# Patient Record
Sex: FEMALE | Race: WHITE | NOT HISPANIC OR LATINO | Employment: FULL TIME | URBAN - METROPOLITAN AREA
[De-identification: names, ages, dates, MRNs, and addresses within clinical notes are randomized per-mention and may not be internally consistent; named-entity substitution may affect disease eponyms.]

---
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
## 2022-04-30 NOTE — OP NOTE
Patient:   Amalia Guadarrama            MRN: [id number]            FIN: [id number]               Age:   18 years     Sex:  Female     :  2000   Associated Diagnoses:   Cholelithiasis without cholecystitis   Author:   Duran Medeiros MD      Operative Note   Operative Information   Date/ Time:  12/10/2018 09:05:00.     Procedures Performed: Laparoscopic cholecystectomy.     Preoperative Diagnosis: Cholelithiasis without cholecystitis (OUM75-FW K80.20).     Postoperative Diagnosis: Cholelithiasis without cholecystitis (NOZ34-BN K80.20).     Surgeon: Duran Medeiros MD.     Assistant: non-physician.     Anesthesia: GETA.     Esimated blood loss: loss less than  10  cc.     Description of Procedure/Findings/    Complications: After consent was obtained and placed on the chart the patient was brought to the operating room and placed supine on the operating table and induced with general anesthesia.  She was then endotracheally intubated without incident.  An adequate level of anesthesia and obtained her abdomen was prepped and draped in sterile fashion.  #15 blade was used to make an incision just below the umbilicus a curvilinear fashion.  Blunt and sharp dissection and electrocautery carried this down of the abdominal wall fascia which was opened with electrocautery.  The parietal peritoneum was then entered with a hemostat.  A Walsh trocar was then inserted and the abdomen was insufflated with CO2.  Upper scope was introduced and a brief inspection revealed no adhesions.  2 5 mm trochars placed in right flank under direct vision.  And a 3rd 5 mm trocar was placed in the upper midline.  The patient was positioned in a reverse Trendelenburg position and the gallbladder was retracted anteriorly and superiorly.  Dissection was undertaken to reveal the cystic duct which was doubly clipped and divided. Further dissection revealed the cystic artery which was doubly clipped and divided.   Electrocautery was then utilized to release the gallbladder from its attachments to the gallbladder bed.  Present delivered via the umbilical incision.  The gallbladder bed was irrigated and inspected and found to be hemostatic and with no evidence of bile leak.  Procedure was interrupted at this point with all trochars being removed and the umbilical incision closed with figure-of-eight 0 Vicryl suture.  Skin was closed at all in the sites with running subcuticular 4-0 Monocryl.  Sterile dressings were applied and patient was recovered from her anesthesia, x-ray in the operating room, and brought to recovery stable condition.  Overall she tolerated the procedure well..

## 2023-10-05 ENCOUNTER — OFFICE VISIT (OUTPATIENT)
Dept: PRIMARY CARE CLINIC | Facility: CLINIC | Age: 23
End: 2023-10-05
Payer: COMMERCIAL

## 2023-10-05 VITALS
HEIGHT: 64 IN | HEART RATE: 70 BPM | WEIGHT: 256 LBS | OXYGEN SATURATION: 98 % | RESPIRATION RATE: 18 BRPM | TEMPERATURE: 98 F | DIASTOLIC BLOOD PRESSURE: 81 MMHG | SYSTOLIC BLOOD PRESSURE: 116 MMHG | BODY MASS INDEX: 43.71 KG/M2

## 2023-10-05 DIAGNOSIS — Z12.4 CERVICAL CANCER SCREENING: ICD-10-CM

## 2023-10-05 DIAGNOSIS — E66.01 CLASS 3 SEVERE OBESITY WITHOUT SERIOUS COMORBIDITY WITH BODY MASS INDEX (BMI) OF 40.0 TO 44.9 IN ADULT, UNSPECIFIED OBESITY TYPE: ICD-10-CM

## 2023-10-05 DIAGNOSIS — F41.1 GAD (GENERALIZED ANXIETY DISORDER): ICD-10-CM

## 2023-10-05 DIAGNOSIS — Z00.00 WELLNESS EXAMINATION: ICD-10-CM

## 2023-10-05 DIAGNOSIS — Z76.89 ENCOUNTER TO ESTABLISH CARE WITH NEW DOCTOR: Primary | ICD-10-CM

## 2023-10-05 PROBLEM — E66.813 CLASS 3 SEVERE OBESITY WITHOUT SERIOUS COMORBIDITY WITH BODY MASS INDEX (BMI) OF 40.0 TO 44.9 IN ADULT: Status: ACTIVE | Noted: 2023-10-05

## 2023-10-05 PROCEDURE — 3008F BODY MASS INDEX DOCD: CPT | Mod: CPTII,,, | Performed by: STUDENT IN AN ORGANIZED HEALTH CARE EDUCATION/TRAINING PROGRAM

## 2023-10-05 PROCEDURE — 3074F SYST BP LT 130 MM HG: CPT | Mod: CPTII,,, | Performed by: STUDENT IN AN ORGANIZED HEALTH CARE EDUCATION/TRAINING PROGRAM

## 2023-10-05 PROCEDURE — 3074F PR MOST RECENT SYSTOLIC BLOOD PRESSURE < 130 MM HG: ICD-10-PCS | Mod: CPTII,,, | Performed by: STUDENT IN AN ORGANIZED HEALTH CARE EDUCATION/TRAINING PROGRAM

## 2023-10-05 PROCEDURE — 3008F PR BODY MASS INDEX (BMI) DOCUMENTED: ICD-10-PCS | Mod: CPTII,,, | Performed by: STUDENT IN AN ORGANIZED HEALTH CARE EDUCATION/TRAINING PROGRAM

## 2023-10-05 PROCEDURE — 99203 PR OFFICE/OUTPT VISIT, NEW, LEVL III, 30-44 MIN: ICD-10-PCS | Mod: ,,, | Performed by: STUDENT IN AN ORGANIZED HEALTH CARE EDUCATION/TRAINING PROGRAM

## 2023-10-05 PROCEDURE — 1159F MED LIST DOCD IN RCRD: CPT | Mod: CPTII,,, | Performed by: STUDENT IN AN ORGANIZED HEALTH CARE EDUCATION/TRAINING PROGRAM

## 2023-10-05 PROCEDURE — 1160F PR REVIEW ALL MEDS BY PRESCRIBER/CLIN PHARMACIST DOCUMENTED: ICD-10-PCS | Mod: CPTII,,, | Performed by: STUDENT IN AN ORGANIZED HEALTH CARE EDUCATION/TRAINING PROGRAM

## 2023-10-05 PROCEDURE — 3079F PR MOST RECENT DIASTOLIC BLOOD PRESSURE 80-89 MM HG: ICD-10-PCS | Mod: CPTII,,, | Performed by: STUDENT IN AN ORGANIZED HEALTH CARE EDUCATION/TRAINING PROGRAM

## 2023-10-05 PROCEDURE — 1160F RVW MEDS BY RX/DR IN RCRD: CPT | Mod: CPTII,,, | Performed by: STUDENT IN AN ORGANIZED HEALTH CARE EDUCATION/TRAINING PROGRAM

## 2023-10-05 PROCEDURE — 3079F DIAST BP 80-89 MM HG: CPT | Mod: CPTII,,, | Performed by: STUDENT IN AN ORGANIZED HEALTH CARE EDUCATION/TRAINING PROGRAM

## 2023-10-05 PROCEDURE — 99203 OFFICE O/P NEW LOW 30 MIN: CPT | Mod: ,,, | Performed by: STUDENT IN AN ORGANIZED HEALTH CARE EDUCATION/TRAINING PROGRAM

## 2023-10-05 PROCEDURE — 1159F PR MEDICATION LIST DOCUMENTED IN MEDICAL RECORD: ICD-10-PCS | Mod: CPTII,,, | Performed by: STUDENT IN AN ORGANIZED HEALTH CARE EDUCATION/TRAINING PROGRAM

## 2023-10-05 RX ORDER — SERTRALINE HYDROCHLORIDE 100 MG/1
100 TABLET, FILM COATED ORAL
COMMUNITY
Start: 2023-09-20 | End: 2023-11-01 | Stop reason: SDUPTHER

## 2023-10-05 RX ORDER — DICYCLOMINE HYDROCHLORIDE 20 MG/1
20 TABLET ORAL EVERY 6 HOURS PRN
COMMUNITY
Start: 2023-07-26

## 2023-10-05 NOTE — PROGRESS NOTES
Subjective:       Patient ID: Amalia Gaudarrama is a 23 y.o. female.    Past Medical History:   GERD  Generalized anxiety disorder  IBS (irritable bowel syndrome)  Kidney stones     Chief Complaint: Establish Care    Presents to the clinic to establish care. No acute complaints at this time. Anxiety well controlled on  Zoloft. Due for pap smear, would like a referral to GYN.       Review of Systems   Constitutional:  Negative for chills and fever.   Respiratory:  Negative for cough.    Cardiovascular:  Negative for chest pain.   Gastrointestinal:  Negative for abdominal pain and vomiting.   Genitourinary:  Negative for dysuria and hematuria.   Psychiatric/Behavioral:  Negative for dysphoric mood. The patient is not nervous/anxious.          Objective:      Physical Exam  Vitals and nursing note reviewed.   Constitutional:       General: She is not in acute distress.     Appearance: Normal appearance. She is not ill-appearing.   HENT:      Mouth/Throat:      Mouth: Mucous membranes are moist.   Eyes:      General: No scleral icterus.     Conjunctiva/sclera: Conjunctivae normal.   Cardiovascular:      Rate and Rhythm: Normal rate and regular rhythm.      Pulses: Normal pulses.      Heart sounds: Normal heart sounds. No murmur heard.  Pulmonary:      Effort: Pulmonary effort is normal. No respiratory distress.      Breath sounds: Normal breath sounds. No wheezing.   Abdominal:      Palpations: Abdomen is soft.      Tenderness: There is no abdominal tenderness.   Musculoskeletal:      Right lower leg: No edema.      Left lower leg: No edema.   Skin:     General: Skin is warm.      Coloration: Skin is not jaundiced.   Neurological:      Mental Status: She is alert. Mental status is at baseline.      Gait: Gait normal.   Psychiatric:         Mood and Affect: Mood normal.         Behavior: Behavior normal.         Assessment & Plan:   1. Encounter to establish care with new doctor  Comments:  Reviewed medical  history and reconciled medications  Ordered wellness labs  Requested records from previous provider/specialist    2. NISA (generalized anxiety disorder)  Assessment & Plan:  Stable, improved   Continue Zoloft  Encouraged self coping mechanisms (deep breathing, exercise, yoga, meditation, etc)     3. Class 3 severe obesity without serious comorbidity with body mass index (BMI) of 40.0 to 44.9 in adult, unspecified obesity type  Assessment & Plan:  Encouraged healthy lifestyle/diet modifications   Exercise 3-5x a week (>30 minutes, including a variety of cardio, weightbearing and lifting exercises)   Eat a well balanced diet comprised of fruit/vegetables, lean protein, and complex carbs    4. Wellness examination  -     CBC Auto Differential; Future; Expected date: 01/05/2024  -     Comprehensive Metabolic Panel; Future; Expected date: 01/05/2024  -     Urinalysis; Future; Expected date: 01/05/2024  -     TSH; Future; Expected date: 01/05/2024  -     Lipid Panel; Future; Expected date: 01/05/2024  -     Hemoglobin A1C; Future; Expected date: 01/05/2024  -     HIV 1/2 Ag/Ab (4th Gen); Future; Expected date: 01/05/2024  -     Hepatitis C Antibody; Future; Expected date: 01/05/2024    5. Cervical cancer screening  -     Ambulatory referral/consult to Obstetrics / Gynecology; Future; Expected date: 10/12/2023    Follow up in about 3 months (around 1/5/2024) for Wellness. In addition to their scheduled follow up, the patient has also been instructed to follow up on as needed basis.

## 2023-11-01 RX ORDER — SERTRALINE HYDROCHLORIDE 100 MG/1
100 TABLET, FILM COATED ORAL DAILY
Qty: 90 TABLET | Refills: 3 | Status: SHIPPED | OUTPATIENT
Start: 2023-11-01

## 2023-11-01 NOTE — TELEPHONE ENCOUNTER
----- Message from Debbi Aquino sent at 11/1/2023  9:09 AM CDT -----  Regarding: Refill Request  .Type:  RX Refill Request    Who Called: pt  Refill or New Rx:refill  RX Name and Strength:sertraline (ZOLOFT) 100 MG tablet  How is the patient currently taking it? (ex. 1XDay):1x  Is this a 30 day or 90 day RX:30  Preferred Pharmacy with phone number:[facility] in [place]  Local or Mail Order:local  Ordering Provider:ELDER Carreon  Would the patient rather a call back or a response via [facility]?   Best Call Back Number:[phone]  Additional Information: refill request and patient is out of medication

## 2023-11-03 PROBLEM — E66.813 CLASS 3 SEVERE OBESITY WITHOUT SERIOUS COMORBIDITY WITH BODY MASS INDEX (BMI) OF 40.0 TO 44.9 IN ADULT: Chronic | Status: ACTIVE | Noted: 2023-10-05

## 2023-11-03 PROBLEM — E66.01 CLASS 3 SEVERE OBESITY WITHOUT SERIOUS COMORBIDITY WITH BODY MASS INDEX (BMI) OF 40.0 TO 44.9 IN ADULT: Chronic | Status: ACTIVE | Noted: 2023-10-05

## 2023-11-04 NOTE — ASSESSMENT & PLAN NOTE
Stable, improved   Continue Zoloft  Encouraged self coping mechanisms (deep breathing, exercise, yoga, meditation, etc)

## 2024-01-08 PROBLEM — Z00.00 WELLNESS EXAMINATION: Chronic | Status: RESOLVED | Noted: 2023-10-05 | Resolved: 2024-01-08

## 2024-11-07 DIAGNOSIS — F41.1 GAD (GENERALIZED ANXIETY DISORDER): Primary | Chronic | ICD-10-CM

## 2024-11-07 RX ORDER — SERTRALINE HYDROCHLORIDE 100 MG/1
100 TABLET, FILM COATED ORAL
Qty: 90 TABLET | Refills: 3 | OUTPATIENT
Start: 2024-11-07

## 2024-11-07 RX ORDER — SERTRALINE HYDROCHLORIDE 100 MG/1
100 TABLET, FILM COATED ORAL DAILY
Qty: 90 TABLET | Refills: 3 | Status: SHIPPED | OUTPATIENT
Start: 2024-11-07